# Patient Record
Sex: MALE | Race: WHITE | NOT HISPANIC OR LATINO | Employment: FULL TIME | URBAN - METROPOLITAN AREA
[De-identification: names, ages, dates, MRNs, and addresses within clinical notes are randomized per-mention and may not be internally consistent; named-entity substitution may affect disease eponyms.]

---

## 2022-02-12 ENCOUNTER — HOSPITAL ENCOUNTER (EMERGENCY)
Facility: CLINIC | Age: 62
Discharge: HOME | End: 2022-02-12
Attending: EMERGENCY MEDICINE | Admitting: EMERGENCY MEDICINE
Payer: COMMERCIAL

## 2022-02-12 ENCOUNTER — APPOINTMENT (OUTPATIENT)
Dept: GENERAL RADIOLOGY | Facility: CLINIC | Age: 62
End: 2022-02-12
Attending: EMERGENCY MEDICINE
Payer: COMMERCIAL

## 2022-02-12 VITALS
HEIGHT: 71 IN | DIASTOLIC BLOOD PRESSURE: 93 MMHG | BODY MASS INDEX: 26.04 KG/M2 | TEMPERATURE: 97.6 F | SYSTOLIC BLOOD PRESSURE: 141 MMHG | RESPIRATION RATE: 29 BRPM | HEART RATE: 95 BPM | OXYGEN SATURATION: 96 % | WEIGHT: 186 LBS

## 2022-02-12 DIAGNOSIS — R07.9 CHEST PAIN, UNSPECIFIED TYPE: ICD-10-CM

## 2022-02-12 LAB
ALBUMIN SERPL-MCNC: 3.8 G/DL (ref 3.4–5)
ALP SERPL-CCNC: 99 U/L (ref 40–150)
ALT SERPL W P-5'-P-CCNC: 51 U/L (ref 0–70)
ANION GAP SERPL CALCULATED.3IONS-SCNC: 6 MMOL/L (ref 3–14)
AST SERPL W P-5'-P-CCNC: 38 U/L (ref 0–45)
ATRIAL RATE - MUSE: 98 BPM
BASOPHILS # BLD AUTO: 0 10E3/UL (ref 0–0.2)
BASOPHILS NFR BLD AUTO: 1 %
BILIRUB SERPL-MCNC: 0.6 MG/DL (ref 0.2–1.3)
BUN SERPL-MCNC: 12 MG/DL (ref 7–30)
CALCIUM SERPL-MCNC: 9 MG/DL (ref 8.5–10.1)
CHLORIDE BLD-SCNC: 109 MMOL/L (ref 94–109)
CO2 SERPL-SCNC: 24 MMOL/L (ref 20–32)
CREAT SERPL-MCNC: 0.7 MG/DL (ref 0.66–1.25)
D DIMER PPP FEU-MCNC: 0.45 UG/ML FEU (ref 0–0.5)
DIASTOLIC BLOOD PRESSURE - MUSE: NORMAL MMHG
EOSINOPHIL # BLD AUTO: 0.1 10E3/UL (ref 0–0.7)
EOSINOPHIL NFR BLD AUTO: 3 %
ERYTHROCYTE [DISTWIDTH] IN BLOOD BY AUTOMATED COUNT: 11.8 % (ref 10–15)
GFR SERPL CREATININE-BSD FRML MDRD: >90 ML/MIN/1.73M2
GLUCOSE BLD-MCNC: 109 MG/DL (ref 70–99)
HCT VFR BLD AUTO: 43.7 % (ref 40–53)
HGB BLD-MCNC: 15.1 G/DL (ref 13.3–17.7)
IMM GRANULOCYTES # BLD: 0 10E3/UL
IMM GRANULOCYTES NFR BLD: 0 %
INTERPRETATION ECG - MUSE: NORMAL
LYMPHOCYTES # BLD AUTO: 1.8 10E3/UL (ref 0.8–5.3)
LYMPHOCYTES NFR BLD AUTO: 34 %
MCH RBC QN AUTO: 33.6 PG (ref 26.5–33)
MCHC RBC AUTO-ENTMCNC: 34.6 G/DL (ref 31.5–36.5)
MCV RBC AUTO: 97 FL (ref 78–100)
MONOCYTES # BLD AUTO: 0.6 10E3/UL (ref 0–1.3)
MONOCYTES NFR BLD AUTO: 11 %
NEUTROPHILS # BLD AUTO: 2.7 10E3/UL (ref 1.6–8.3)
NEUTROPHILS NFR BLD AUTO: 51 %
NRBC # BLD AUTO: 0 10E3/UL
NRBC BLD AUTO-RTO: 0 /100
NT-PROBNP SERPL-MCNC: 12 PG/ML (ref 0–900)
P AXIS - MUSE: 29 DEGREES
PLATELET # BLD AUTO: 184 10E3/UL (ref 150–450)
POTASSIUM BLD-SCNC: 3.8 MMOL/L (ref 3.4–5.3)
PR INTERVAL - MUSE: 146 MS
PROT SERPL-MCNC: 7.4 G/DL (ref 6.8–8.8)
QRS DURATION - MUSE: 82 MS
QT - MUSE: 338 MS
QTC - MUSE: 431 MS
R AXIS - MUSE: 12 DEGREES
RBC # BLD AUTO: 4.5 10E6/UL (ref 4.4–5.9)
SODIUM SERPL-SCNC: 139 MMOL/L (ref 133–144)
SYSTOLIC BLOOD PRESSURE - MUSE: NORMAL MMHG
T AXIS - MUSE: 54 DEGREES
TROPONIN I SERPL HS-MCNC: 4 NG/L
VENTRICULAR RATE- MUSE: 98 BPM
WBC # BLD AUTO: 5.2 10E3/UL (ref 4–11)

## 2022-02-12 PROCEDURE — 80053 COMPREHEN METABOLIC PANEL: CPT | Performed by: EMERGENCY MEDICINE

## 2022-02-12 PROCEDURE — 84484 ASSAY OF TROPONIN QUANT: CPT | Performed by: EMERGENCY MEDICINE

## 2022-02-12 PROCEDURE — 85025 COMPLETE CBC W/AUTO DIFF WBC: CPT | Performed by: EMERGENCY MEDICINE

## 2022-02-12 PROCEDURE — 85379 FIBRIN DEGRADATION QUANT: CPT | Performed by: EMERGENCY MEDICINE

## 2022-02-12 PROCEDURE — 99285 EMERGENCY DEPT VISIT HI MDM: CPT | Mod: 25

## 2022-02-12 PROCEDURE — 36415 COLL VENOUS BLD VENIPUNCTURE: CPT | Performed by: EMERGENCY MEDICINE

## 2022-02-12 PROCEDURE — 83880 ASSAY OF NATRIURETIC PEPTIDE: CPT | Performed by: EMERGENCY MEDICINE

## 2022-02-12 PROCEDURE — 71046 X-RAY EXAM CHEST 2 VIEWS: CPT

## 2022-02-12 PROCEDURE — 93005 ELECTROCARDIOGRAM TRACING: CPT

## 2022-02-12 ASSESSMENT — MIFFLIN-ST. JEOR: SCORE: 1675.82

## 2022-02-12 NOTE — ED PROVIDER NOTES
"  History     Chief Complaint:  Chest Pain       HPI   Liam Fraser is a 61 year old male who presents with chest pain.  He indicates that he has had intermittent chest pain for the last couple of weeks, which is a very dull ache and does not apparently last very long.  Previous to today, he had not had any other symptoms associated with it.  This morning he had a worse episode of the chest pain and felt some shortness of breath at that time.  He does still have some discomfort and some shortness of breath currently, but he indicates that it is barely noticeable at this time.  He denies any nausea, vomiting, feeling lightheaded or dizzy, abdominal pain, or any other symptoms.  He indicates that he has a doctor's appointment coming up to establish primary care.    ROS:  Review of Systems   All other systems reviewed and are negative.         Allergies:  No Known Allergies     Medications:    No current outpatient medications on file.      Past Medical History:    No past medical history on file.  There is no problem list on file for this patient.       Past Surgical History:    No past surgical history on file.     Family History:    Strong cardiac family history in his father and grandfather at a young age.    Social History:   reports that he has quit smoking. He has never used smokeless tobacco.  PCP: No primary care provider on file.     Physical Exam   Patient Vitals for the past 24 hrs:   BP Temp Temp src Pulse Resp SpO2 Height Weight   02/12/22 1155 (!) 172/102 97.6  F (36.4  C) Tympanic 100 20 97 % 1.803 m (5' 11\") 84.4 kg (186 lb)        Physical Exam  Nursing note and vitals reviewed.  Constitutional:  Oriented to person, place, and time. Cooperative.   HENT:   Nose:    Nose normal.   Mouth/Throat:   Facemask in place.   Eyes:    Conjunctivae normal and EOM are normal.      Pupils are equal, round, and reactive to light.   Neck:    Trachea normal.   Cardiovascular:  Normal rate, regular rhythm, normal " heart sounds and normal pulses. No murmur heard.  Pulmonary/Chest:  Effort normal and breath sounds normal.   Abdominal:   Soft. Normal appearance and bowel sounds are normal.      There is no tenderness.      There is no rebound and no CVA tenderness.   Musculoskeletal:  Extremities atraumatic x 4.   Lymphadenopathy:  No cervical adenopathy.   Neurological:   Alert and oriented to person, place, and time. Normal strength.      No cranial nerve deficit or sensory deficit. GCS eye subscore is 4. GCS verbal subscore is 5. GCS motor subscore is 6.   Skin:    Skin is intact. No rash noted.   Psychiatric:   Normal mood and affect.      Emergency Department Course   ECG:  ECG  ECG taken at 1154, ECG read at 1204  Sinus rhythm with occasional premature ventricular complexes.  Rate 98 bpm. WA interval 146 ms. QRS duration 82 ms. QT/QTc 338/431 ms. P-R-T axes 29 12 54.       Imaging:  XR Chest 2 Views   Preliminary Result   IMPRESSION: Negative chest.         Exercise Stress Echocardiogram    (Results Pending)      Report per radiology    Laboratory:  Labs Ordered and Resulted from Time of ED Arrival to Time of ED Departure   COMPREHENSIVE METABOLIC PANEL - Abnormal       Result Value    Sodium 139      Potassium 3.8      Chloride 109      Carbon Dioxide (CO2) 24      Anion Gap 6      Urea Nitrogen 12      Creatinine 0.70      Calcium 9.0      Glucose 109 (*)     Alkaline Phosphatase 99      AST 38      ALT 51      Protein Total 7.4      Albumin 3.8      Bilirubin Total 0.6      GFR Estimate >90     CBC WITH PLATELETS AND DIFFERENTIAL - Abnormal    WBC Count 5.2      RBC Count 4.50      Hemoglobin 15.1      Hematocrit 43.7      MCV 97      MCH 33.6 (*)     MCHC 34.6      RDW 11.8      Platelet Count 184      % Neutrophils 51      % Lymphocytes 34      % Monocytes 11      % Eosinophils 3      % Basophils 1      % Immature Granulocytes 0      NRBCs per 100 WBC 0      Absolute Neutrophils 2.7      Absolute Lymphocytes 1.8       Absolute Monocytes 0.6      Absolute Eosinophils 0.1      Absolute Basophils 0.0      Absolute Immature Granulocytes 0.0      Absolute NRBCs 0.0     D DIMER QUANTITATIVE - Normal    D-Dimer Quantitative 0.45     TROPONIN I - Normal    Troponin I High Sensitivity 4     NT PROBNP INPATIENT - Normal    N terminal Pro BNP Inpatient 12            Emergency Department Course:  Reviewed:  I reviewed nursing notes, vitals, past medical history, Care Everywhere and [facility]      Disposition:  The patient was discharged to home.     Impression & Plan    Covid-19  Liam Fraser was evaluated during a global COVID-19 pandemic, which necessitated consideration that the patient might be at risk for infection with the SARS-CoV-2 virus that causes COVID-19.   Applicable protocols for evaluation were followed during the patient's care.   COVID-19 was considered as part of the patient's evaluation.    Medical Decision Making:  This is a 61-year-old male who came in for further evaluation of intermittent chest pain for the last couple of weeks.  The reason he came in today was due to a more intense episode of chest pain along with some shortness of breath today.  He also does have a strong family history of cardiac disease, and he is a prior smoker.  I proceeded with the above work-up including EKG and blood work.  His EKG does not show any acute findings.  His blood work also was unremarkable including his troponin and D-dimer.  Therefore I feel that pulmonary embolism has been sufficiently ruled out.  He then had a chest x-ray as well, which is unremarkable.  I had a long discussion with him about staying in the hospital for further evaluation and management, as I am concerned about his symptoms and the possibility of this being a warning sign, especially given his family history.  At the same time, I do not feel that a delta troponin in the ER is necessary based on the duration of his symptoms and history.  My recommendation however  was that he stay in the hospital.  However he prefers to go home.  He understands there is risk in going home including a significant cardiac event and even death.  I am ordering an outpatient stress test for him, and I stressed to the importance of close outpatient follow-up with the primary care clinic and a cardiologist.  He indicated that he is going to arrange for an appointment with a cardiologist himself.  He knows to return here with any concerns or worsening symptoms as well.    Diagnosis:    ICD-10-CM    1. Chest pain, unspecified type  R07.9 Exercise Stress Echocardiogram        Discharge Medications:  New Prescriptions    No medications on file        2/12/2022   Juan Luis Philip MD Lashkowitz, Seth H, MD  02/12/22 1312

## 2022-03-02 ENCOUNTER — DOCUMENTATION ONLY (OUTPATIENT)
Dept: CARDIOLOGY | Facility: CLINIC | Age: 62
End: 2022-03-02
Payer: COMMERCIAL

## 2022-03-02 NOTE — PROGRESS NOTES
Please be advised that we have attempted to reach this patient to schedule their cardiac stress testing however three attempts have been made to contact patient and they have not returned our call.  No further attempts to reach this patient will by made by scheduling team.  Please contact this patient to encourage them to call our office at [phone] and schedule this order if it is still clinically recommended.      Thank you for allowing [facility] to participate in this patients healthcare needs.